# Patient Record
Sex: MALE | Race: OTHER | NOT HISPANIC OR LATINO | URBAN - METROPOLITAN AREA
[De-identification: names, ages, dates, MRNs, and addresses within clinical notes are randomized per-mention and may not be internally consistent; named-entity substitution may affect disease eponyms.]

---

## 2018-02-09 ENCOUNTER — EMERGENCY (EMERGENCY)
Facility: HOSPITAL | Age: 53
LOS: 1 days | Discharge: HOME | End: 2018-02-09
Attending: EMERGENCY MEDICINE
Payer: COMMERCIAL

## 2018-02-09 VITALS
TEMPERATURE: 98 F | WEIGHT: 177.91 LBS | RESPIRATION RATE: 18 BRPM | DIASTOLIC BLOOD PRESSURE: 88 MMHG | HEIGHT: 69 IN | HEART RATE: 66 BPM | SYSTOLIC BLOOD PRESSURE: 132 MMHG

## 2018-02-09 PROCEDURE — 93010 ELECTROCARDIOGRAM REPORT: CPT

## 2018-02-09 PROCEDURE — 99284 EMERGENCY DEPT VISIT MOD MDM: CPT

## 2018-02-09 PROCEDURE — 99284 EMERGENCY DEPT VISIT MOD MDM: CPT | Mod: 25

## 2018-02-09 PROCEDURE — 93005 ELECTROCARDIOGRAM TRACING: CPT

## 2018-02-09 RX ORDER — ASPIRIN/CALCIUM CARB/MAGNESIUM 324 MG
325 TABLET ORAL ONCE
Qty: 0 | Refills: 0 | Status: COMPLETED | OUTPATIENT
Start: 2018-02-09 | End: 2018-02-09

## 2018-02-09 RX ADMIN — Medication 325 MILLIGRAM(S): at 09:36

## 2018-02-09 NOTE — ED PROVIDER NOTE - MEDICAL DECISION MAKING DETAILS
acs vs pna vs msk as worse with movement however not reproducible at this time--unlikely PE not hypoxic no le swelling; unlikely dissection no tearing pain, VS wnl radial pulses 2+ bl--will give asa 2 trops ekg labs--if neg dc with cardiology follow up

## 2018-02-09 NOTE — ED PROVIDER NOTE - OBJECTIVE STATEMENT
51yo M no pmhx p/w midsternal/right sided cp since 7:30am. notes that was at work at the time of the pain, pressure like non-radiating worse with movement. now resolved lasted until getting to the ED. no associated symptoms. Denies f/c/n/v/cp/sob/palpitations/ cough/rash/headache/dizziness/abd.pain/d/c/dysuria/hematuria. no sick contacts no recent travel no hx of dvt/pe. no fmhx of cardiac hx , never had a cardiac evaluation in the past. no pain at this time. notes that was a little stressed but nothing too crazy. no heavy lifting. denies drug alcohol or smoking.

## 2018-02-09 NOTE — ED PROVIDER NOTE - PROGRESS NOTE DETAILS
pt notes that has no pain at this time does not want to wait for any labs or cxr wants to leave; explained to pt that would advise to check trop and cxr as had cp earlier today pt understands the risk of leaving AMA that he can have a heart attack become disabled or die--still wants to leave states that [place] [facility] [place] given--will sign out AMA; pt not psychotic no si/hi can make his own decisions

## 2022-03-11 NOTE — ED ADULT NURSE NOTE - NS ED NURSE LEVEL OF CONSCIOUSNESS MENTAL STATUS
See follow up visit from  11/4/2021   .  brDate / Results of last TB test  9/24/2021   -   Negative  brLabs and/or Additional orders: CBC &amp CMP drawn today: CBC &amp CMP due in September brInsurance authorization: BCTN Approved PA for continuation of Entyvio-12/11/21-06/08/22 (SP)brPharmacy:  Buy and Bill  brRate of Infusion:  Standard   
br   Infusion order placed on:  1/6/2022   
br 
Awake/Cooperative/Alert

## 2022-06-07 PROBLEM — Z00.00 ENCOUNTER FOR PREVENTIVE HEALTH EXAMINATION: Status: ACTIVE | Noted: 2022-06-07

## 2022-06-13 ENCOUNTER — APPOINTMENT (OUTPATIENT)
Dept: SURGERY | Facility: CLINIC | Age: 57
End: 2022-06-13
Payer: COMMERCIAL

## 2022-06-13 VITALS
BODY MASS INDEX: 25.03 KG/M2 | SYSTOLIC BLOOD PRESSURE: 138 MMHG | HEIGHT: 69 IN | DIASTOLIC BLOOD PRESSURE: 72 MMHG | HEART RATE: 72 BPM | OXYGEN SATURATION: 98 % | WEIGHT: 169 LBS

## 2022-06-13 VITALS — TEMPERATURE: 98.3 F

## 2022-06-13 DIAGNOSIS — Z78.9 OTHER SPECIFIED HEALTH STATUS: ICD-10-CM

## 2022-06-13 PROCEDURE — 99203 OFFICE O/P NEW LOW 30 MIN: CPT

## 2022-06-13 RX ORDER — AMOXICILLIN 500 MG/1
500 CAPSULE ORAL
Qty: 21 | Refills: 0 | Status: ACTIVE | COMMUNITY
Start: 2022-01-08

## 2022-06-13 RX ORDER — SILDENAFIL 100 MG/1
100 TABLET, FILM COATED ORAL
Qty: 6 | Refills: 0 | Status: ACTIVE | COMMUNITY
Start: 2022-04-16

## 2022-06-13 RX ORDER — IBUPROFEN 800 MG/1
800 TABLET, FILM COATED ORAL
Qty: 20 | Refills: 0 | Status: ACTIVE | COMMUNITY
Start: 2022-01-08

## 2022-06-13 NOTE — PLAN
[FreeTextEntry1] : Mr. ROBERT  was told significance of findings, options, risks and benefits were explained.  Informed consent for incarcerated ventral hernia repair and potential risks, benefits and alternatives (surgical options were discussed including non-surgical options or the option of no surgery) to the planned surgery were discussed in depth.  All surgical options were discussed including non-surgical treatments.  He wishes to proceed with surgery.  We will plan for surgery on at the next available date, pending any required insurance pre-certification or pre-approval. He agrees to obtain any necessary pre-operative evaluations and testing prior to surgery.\par Patient advised to seek immediate medical attention with any acute change in symptoms or with the development of any new or worsening symptoms.  Patient's questions and concerns addressed to patient's satisfaction, and patient verbalized an understanding of the information discussed.\par \par

## 2022-06-13 NOTE — CONSULT LETTER
[Dear  ___] : Dear  [unfilled], [Consult Letter:] : I had the pleasure of evaluating your patient, [unfilled]. [Please see my note below.] : Please see my note below. [Consult Closing:] : Thank you very much for allowing me to participate in the care of this patient.  If you have any questions, please do not hesitate to contact me. [Sincerely,] : Sincerely, [FreeTextEntry3] : Cirilo Irving MD, FACS

## 2022-06-13 NOTE — PHYSICAL EXAM
[Alert] : alert [Oriented to Person] : oriented to person [Oriented to Place] : oriented to place [Oriented to Time] : oriented to time [Calm] : calm [de-identified] : He  is alert, well-groomed, and in NAD\par   [de-identified] : anicteric.  Nasal mucosa pink, septum midline. Oral mucosa pink.  Tongue midline, Pharynx without exudates.\par   [de-identified] : Neck supple. Trachea midline. Thyroid isthmus barely palpable, lobes not felt.\par   [de-identified] : -incarcerated ventral  hernia, mildly tender.  The defect appears to be relatively small  and the skin overlying the hernia is normal

## 2022-06-13 NOTE — HISTORY OF PRESENT ILLNESS
[de-identified] : Mr. SG ROBERT is a 56 year  old male who was referred by Dr. Merced Toure with the chief complaint of having a hernia.  He reports having this condition for many years. He denies any trauma to the area, fever, nausea, vomiting, distension, night sweats and  loss of appetite.   he is asymptomatic  - He reports normal bowel movements    -He  denies previous abdominal surgeries or  wound infections. \par \par \par \par

## 2022-07-01 ENCOUNTER — OUTPATIENT (OUTPATIENT)
Dept: OUTPATIENT SERVICES | Facility: HOSPITAL | Age: 57
LOS: 1 days | End: 2022-07-01
Payer: COMMERCIAL

## 2022-07-01 VITALS
OXYGEN SATURATION: 100 % | RESPIRATION RATE: 18 BRPM | HEART RATE: 58 BPM | DIASTOLIC BLOOD PRESSURE: 90 MMHG | TEMPERATURE: 98 F | SYSTOLIC BLOOD PRESSURE: 139 MMHG | HEIGHT: 69 IN | WEIGHT: 169.09 LBS

## 2022-07-01 DIAGNOSIS — Z01.818 ENCOUNTER FOR OTHER PREPROCEDURAL EXAMINATION: ICD-10-CM

## 2022-07-01 DIAGNOSIS — K43.6 OTHER AND UNSPECIFIED VENTRAL HERNIA WITH OBSTRUCTION, WITHOUT GANGRENE: ICD-10-CM

## 2022-07-01 DIAGNOSIS — H72.92 UNSPECIFIED PERFORATION OF TYMPANIC MEMBRANE, LEFT EAR: Chronic | ICD-10-CM

## 2022-07-01 PROCEDURE — G0463: CPT

## 2022-07-01 RX ORDER — SODIUM CHLORIDE 9 MG/ML
3 INJECTION INTRAMUSCULAR; INTRAVENOUS; SUBCUTANEOUS EVERY 8 HOURS
Refills: 0 | Status: DISCONTINUED | OUTPATIENT
Start: 2022-07-13 | End: 2022-07-27

## 2022-07-01 NOTE — H&P PST ADULT - PROBLEM SELECTOR PLAN 1
schedule for incarcerated ventral hernia repair. Pt provided with instructions to wash with chlorhexidene 4% solution for three days including the day of surgery. Pt instructed to be npo the night before and morning of surgery. Pt verbalized understanding and provided with written instructions to refer to.      Stop Bang score =2 pt low risk for LAWRENCE

## 2022-07-01 NOTE — H&P PST ADULT - ASSESSMENT
56 yr old healthy male on no medication presents with other and unspecified ventral hernia with obstruction without gangrene. Pt denies any n/v states the area has increased in size. His job encompasses lifting of boxes. Pt concerned about the hernia sought surgical consultation. Pt schedule for incarcerated ventral hernia repair on 7/13/2022. Pt schedule to see PCP on 7/5/2022 for workup.

## 2022-07-01 NOTE — H&P PST ADULT - NSICDXFAMILYHX_GEN_ALL_CORE_FT
FAMILY HISTORY:  Father  Still living? Yes, Estimated age: 76  Known health problems: none, Age at diagnosis: Age Unknown    Mother  Still living? Yes, Estimated age: 73  Known health problems: none, Age at diagnosis: Age Unknown

## 2022-07-01 NOTE — H&P PST ADULT - FALL HARM RISK - UNIVERSAL INTERVENTIONS
Bed in lowest position, wheels locked, appropriate side rails in place/Call bell, personal items and telephone in reach/Instruct patient to call for assistance before getting out of bed or chair/[place] to call system/Physically safe environment - no spills, clutter or unnecessary equipment/Purposeful Proactive Rounding/Room/bathroom lighting operational, light cord in reach

## 2022-07-12 ENCOUNTER — TRANSCRIPTION ENCOUNTER (OUTPATIENT)
Age: 57
End: 2022-07-12

## 2022-07-13 ENCOUNTER — TRANSCRIPTION ENCOUNTER (OUTPATIENT)
Age: 57
End: 2022-07-13

## 2022-07-13 ENCOUNTER — OUTPATIENT (OUTPATIENT)
Dept: OUTPATIENT SERVICES | Facility: HOSPITAL | Age: 57
LOS: 1 days | End: 2022-07-13
Payer: COMMERCIAL

## 2022-07-13 ENCOUNTER — APPOINTMENT (OUTPATIENT)
Dept: SURGERY | Facility: HOSPITAL | Age: 57
End: 2022-07-13

## 2022-07-13 VITALS
TEMPERATURE: 98 F | SYSTOLIC BLOOD PRESSURE: 126 MMHG | HEIGHT: 69 IN | WEIGHT: 169.09 LBS | OXYGEN SATURATION: 100 % | RESPIRATION RATE: 16 BRPM | DIASTOLIC BLOOD PRESSURE: 81 MMHG | HEART RATE: 57 BPM

## 2022-07-13 VITALS
RESPIRATION RATE: 16 BRPM | DIASTOLIC BLOOD PRESSURE: 77 MMHG | HEART RATE: 67 BPM | SYSTOLIC BLOOD PRESSURE: 120 MMHG | OXYGEN SATURATION: 98 % | TEMPERATURE: 98 F

## 2022-07-13 DIAGNOSIS — K43.6 OTHER AND UNSPECIFIED VENTRAL HERNIA WITH OBSTRUCTION, WITHOUT GANGRENE: ICD-10-CM

## 2022-07-13 DIAGNOSIS — H72.92 UNSPECIFIED PERFORATION OF TYMPANIC MEMBRANE, LEFT EAR: Chronic | ICD-10-CM

## 2022-07-13 PROCEDURE — 49561: CPT

## 2022-07-13 PROCEDURE — 49561: CPT | Mod: AS

## 2022-07-13 DEVICE — MESH HERNIA PROLENE 3X6IN: Type: IMPLANTABLE DEVICE | Status: FUNCTIONAL

## 2022-07-13 RX ORDER — FENTANYL CITRATE 50 UG/ML
50 INJECTION INTRAVENOUS
Refills: 0 | Status: DISCONTINUED | OUTPATIENT
Start: 2022-07-13 | End: 2022-07-13

## 2022-07-13 RX ORDER — ACETAMINOPHEN 500 MG
1000 TABLET ORAL ONCE
Refills: 0 | Status: COMPLETED | OUTPATIENT
Start: 2022-07-13 | End: 2022-07-13

## 2022-07-13 RX ORDER — SODIUM CHLORIDE 9 MG/ML
1000 INJECTION, SOLUTION INTRAVENOUS
Refills: 0 | Status: DISCONTINUED | OUTPATIENT
Start: 2022-07-13 | End: 2022-07-13

## 2022-07-13 RX ORDER — OXYCODONE HYDROCHLORIDE 5 MG/1
1 TABLET ORAL
Qty: 4 | Refills: 0
Start: 2022-07-13

## 2022-07-13 RX ORDER — ONDANSETRON 8 MG/1
4 TABLET, FILM COATED ORAL ONCE
Refills: 0 | Status: DISCONTINUED | OUTPATIENT
Start: 2022-07-13 | End: 2022-07-13

## 2022-07-13 RX ORDER — FENTANYL CITRATE 50 UG/ML
25 INJECTION INTRAVENOUS
Refills: 0 | Status: DISCONTINUED | OUTPATIENT
Start: 2022-07-13 | End: 2022-07-13

## 2022-07-13 RX ADMIN — Medication 1000 MILLIGRAM(S): at 12:18

## 2022-07-13 RX ADMIN — Medication 400 MILLIGRAM(S): at 12:04

## 2022-07-13 NOTE — ASU PATIENT PROFILE, ADULT - FALL HARM RISK - UNIVERSAL INTERVENTIONS
Bed in lowest position, wheels locked, appropriate side rails in place/Call bell, personal items and telephone in reach/Instruct patient to call for assistance before getting out of bed or chair/Non-slip footwear when patient is out of bed/[place] to call system/Physically safe environment - no spills, clutter or unnecessary equipment/Purposeful Proactive Rounding/Room/bathroom lighting operational, light cord in reach

## 2022-07-13 NOTE — ASU DISCHARGE PLAN (ADULT/PEDIATRIC) - CARE PROVIDER_API CALL
Cirilo Irving)  Surgery  [address] [facility], [place] [address]  Phone: [phone]  Fax: [phone]  Follow Up Time: 2 weeks

## 2022-07-13 NOTE — ASU PATIENT PROFILE, ADULT - FALL HARM RISK - PATIENT NEEDS ASSISTANCE
[Fever] : no fever [Chills] : no chills [Wheezing] : no wheezing [Fatigue] : fatigue [Dyspnea on Exertion] : no dyspnea on exertion [Cough] : cough [Negative] : Cardiovascular No assistance needed

## 2022-07-14 PROBLEM — K43.6 OTHER AND UNSPECIFIED VENTRAL HERNIA WITH OBSTRUCTION, WITHOUT GANGRENE: Chronic | Status: ACTIVE | Noted: 2022-07-01

## 2022-07-21 ENCOUNTER — APPOINTMENT (OUTPATIENT)
Dept: SURGERY | Facility: CLINIC | Age: 57
End: 2022-07-21

## 2022-07-21 VITALS — TEMPERATURE: 97.3 F

## 2022-07-21 PROCEDURE — 99024 POSTOP FOLLOW-UP VISIT: CPT

## 2022-07-21 NOTE — ASSESSMENT
[FreeTextEntry1] : Mr. ROBERT is doing well, with excellent post-operative recovery. All surgical incisions are healing well and as expected. There is no evidence of infection or complication, and he is progressing as expected. Post-operative wound care, activity, restrictions and precautions reinforced. Patient instructed to refrain from any heavy lifting greater than 10-15 pounds for at least 4-6 weeks post-operatively. Patient's questions and concerns addressed to patient's satisfaction.\par

## 2022-07-21 NOTE — HISTORY OF PRESENT ILLNESS
[de-identified] : Mr. ORBERT  is s/p Incarcerated ventral hernia repair on 07/13/2022. Today  Mr. ROBERT offers no complaints. patient reports no fever or  chills. patient reports occasional discomfort in the surgical area.  His surgical incisions are healing well. No signs of inflammation, infection or exudate. patient reports good bowel movements and appetite.

## 2022-07-21 NOTE — PHYSICAL EXAM
[Calm] : calm [de-identified] : He  is alert, well-groomed, and in NAD\par   [de-identified] : no recurrence Surgical wound is healing well.   no signs of  inflammation or infection.

## 2022-11-10 ENCOUNTER — APPOINTMENT (OUTPATIENT)
Dept: SURGERY | Facility: CLINIC | Age: 57
End: 2022-11-10

## 2022-11-10 VITALS
HEART RATE: 70 BPM | HEIGHT: 69 IN | WEIGHT: 168 LBS | BODY MASS INDEX: 24.88 KG/M2 | SYSTOLIC BLOOD PRESSURE: 136 MMHG | DIASTOLIC BLOOD PRESSURE: 87 MMHG

## 2022-11-10 DIAGNOSIS — K43.6 OTHER AND UNSPECIFIED VENTRAL HERNIA WITH OBSTRUCTION, W/OUT GANGRENE: ICD-10-CM

## 2022-11-10 PROCEDURE — 99213 OFFICE O/P EST LOW 20 MIN: CPT

## 2022-11-10 NOTE — PHYSICAL EXAM
[Alert] : alert [Oriented to Person] : oriented to person [Oriented to Place] : oriented to place [Oriented to Time] : oriented to time [Calm] : calm [de-identified] : He  is alert, well-groomed, and in NAD\par   [de-identified] : anicteric.  Nasal mucosa pink, septum midline. Oral mucosa pink.  Tongue midline, Pharynx without exudates.\par   [de-identified] : Neck supple. Trachea midline. Thyroid isthmus barely palpable, lobes not felt.\par   [de-identified] : no recurrence

## 2022-11-10 NOTE — HISTORY OF PRESENT ILLNESS
[de-identified] : Mr. ROBERT  is s/p Incarcerated ventral hernia repair on 07/13/2022. Today  Mr. ROBERT offers no complaints. patient reports no fever or  chills. patient reports occasional discomfort in the surgical area.  patient reports good bowel movements and appetite.

## 2022-11-10 NOTE — PLAN
[FreeTextEntry1] : Mr. ROBERT will follow up  if needed. Warning signs, follow up, and restrictions were discussed with the patient.

## 2023-08-23 LAB — SARS-COV-2 N GENE NPH QL NAA+PROBE: NOT DETECTED

## 2023-09-12 NOTE — H&P PST ADULT - WEIGHT IN LBS
In responding to this request, please exercise your independent professional judgment.  The fact that a question is asked does not imply that any particular answer is desired or expected. In responding to this request, please exercise your independent professional judgment.  The fact that a question is asked does not imply that any particular answer is desired or expected. 169

## 2024-08-15 NOTE — ASU PREOP CHECKLIST - HAND OFF
Ndc# For Kenalog Only: 3012-3667-40 Require Ndc Code?: No How Many Mls Were Removed From The 40 Mg/Ml (5ml) Vial When Preparing The Injectable Solution?: 0 Total Volume (Ccs): 0.4 Kenalog Preparation: Kenalog Which Kenalog Vial Was Used?: Kenalog 10 mg/ml (5 ml vial) Administered By (Optional): MOLLY Hagan Medical Necessity Clause: This procedure was medically necessary because the lesions that were treated were: Validate Note Data When Using Inventory: Yes Consent: The risks of atrophy were reviewed with the patient. Detail Level: Detailed Kenalog Type Of Vial: Multiple Dose Concentration Of Kenalog Solution Injected (Mg/Ml): 5.0 Unit RN to OR RN

## 2025-04-24 NOTE — ASSESSMENT
It was good to see you.  You are doing a good job with your overall health care.   As you are aware I do think anxiety is one of your biggest issues.  I would like to adjust the Lexapro dose as indicated but I also think you should wean you off the venlafaxine as this works very similarly to the Lexapro.  Will have you take 37.5 mg daily for 2 weeks then take 37.5 mg every other day for about 1 week then stop.  We can then adjust Lexapro or escitalapram as indicated  Your thyroid is over replaced so therefore we are going to decrease your Synthroid to 100 mcg daily we will recheck thyroid functions in 4 to 6 months  Referral to otology is given because of your continued issues with hearing loss and vertigo  Isabell Cardoza   Increased routine regular exercise is encouraged  We are going to obtain an x-ray of your back because of the pain in that right SI joint I do think physical therapy could be very helpful so I placed this order as well  I have placed a referral to urogynecology for the rectocele  Continue close follow-up with breast specialist, cardiologist, nephrology and dermatology as you are doing  Consider Jardiance for many benefits as discussed   Vaccinations  I recommend COVID-19 vaccination every 6 months  Shingrix or shingles vaccination is recommended. This is a series of 2 vaccinations. The second vaccination is given 2-6 months following the first.   I recommend you get RSV in the fall when you get flu shot    General recommendations  I encourage you to stay active and healthy, and to follow these healthy habits:     Try to increase your intake of fish such as salmon and tuna and try to get 2 to 3 servings of fish per week.  Increase your intake of plant-based protein listed here:    Unprocessed nuts, walnuts, or almonds, Nuts and Seeds.      Green vegetables such as Broccoli, Peas, Greens, Spinach    Beans, Chickpeas, & Lentils    Other sources include Potatoes, Quinoa, Seaweed, Soymilk, Tempeh, and  [FreeTextEntry1] : Mr. ROBERT is doing well, with excellent post-operative recovery. All surgical incisions  healed well and as expected. There is no evidence of  complication or recurrence, and he is progressing as expected. Patient can return to normal activity without restrictions.  Patient's questions and concerns addressed to patient's satisfaction.\par   Tofu.  Increase food s higher in flavonoids found in black tea, green tea, apples, nuts, citrus fruit, berries, and dark chocolate.  You should avoid fried foods, and sugary or starchy foods and sugary drinks, and avoid saturated fats.    Try not to dine at restaurants frequently and avoid fast food restaurants.      Try to get restful sleep approximately 7-9 hours every night.  Work towards getting 30 minutes of  moderate intensity exercise 4 to 5 days per week.  You should also try to exercise at least one hour per day with light walking.

## (undated) DEVICE — ELCTR GROUNDING PAD ADULT COVIDIEN

## (undated) DEVICE — DRAPE HALF SHEET 40X57"

## (undated) DEVICE — GLV 7.5 PROTEXIS

## (undated) DEVICE — NDL HYPO SAFE 25G X 1.5"

## (undated) DEVICE — SUT POLYSORB 4-0 27" P-12 UNDYED

## (undated) DEVICE — PACK MINOR NO DRAPE

## (undated) DEVICE — DRAPE LIGHT HANDLE COVER BLUE

## (undated) DEVICE — PREP CHLORAPREP ORANGE 2PCT 26ML

## (undated) DEVICE — SUT POLYSORB 3-0 36" GS-21

## (undated) DEVICE — FOR-ESU VALLEYLAB T7E15008DX: Type: DURABLE MEDICAL EQUIPMENT

## (undated) DEVICE — SOL IRR POUR NS 0.9% 1500ML

## (undated) DEVICE — DRAPE LAPAROTOMY W POUCHES

## (undated) DEVICE — SUT SURGIPRO II 1 30" GS-25

## (undated) DEVICE — BLANKET WARMER UPPER ADULT

## (undated) DEVICE — GLV 7 PROTEXIS

## (undated) DEVICE — DRSG MASTISOL

## (undated) DEVICE — SUT POLYSORB 0 36" GS-25

## (undated) DEVICE — WRAP COMPRESSION CALF MED